# Patient Record
Sex: MALE | Race: ASIAN | NOT HISPANIC OR LATINO | URBAN - METROPOLITAN AREA
[De-identification: names, ages, dates, MRNs, and addresses within clinical notes are randomized per-mention and may not be internally consistent; named-entity substitution may affect disease eponyms.]

---

## 2023-11-08 ENCOUNTER — APPOINTMENT (OUTPATIENT)
Dept: PRIMARY CARE | Facility: CLINIC | Age: 23
End: 2023-11-08
Payer: COMMERCIAL

## 2023-11-08 ENCOUNTER — LAB (OUTPATIENT)
Dept: LAB | Facility: LAB | Age: 23
End: 2023-11-08
Payer: COMMERCIAL

## 2023-11-08 ENCOUNTER — OFFICE VISIT (OUTPATIENT)
Dept: PRIMARY CARE | Facility: CLINIC | Age: 23
End: 2023-11-08
Payer: COMMERCIAL

## 2023-11-08 VITALS
WEIGHT: 157.6 LBS | SYSTOLIC BLOOD PRESSURE: 122 MMHG | HEIGHT: 71 IN | DIASTOLIC BLOOD PRESSURE: 78 MMHG | TEMPERATURE: 98.2 F | BODY MASS INDEX: 22.06 KG/M2

## 2023-11-08 DIAGNOSIS — R10.13 EPIGASTRIC PAIN: Primary | ICD-10-CM

## 2023-11-08 DIAGNOSIS — R10.13 EPIGASTRIC PAIN: ICD-10-CM

## 2023-11-08 LAB
ALBUMIN SERPL BCP-MCNC: 4.6 G/DL (ref 3.4–5)
ALP SERPL-CCNC: 58 U/L (ref 33–120)
ALT SERPL W P-5'-P-CCNC: 30 U/L (ref 10–52)
ANION GAP SERPL CALC-SCNC: 10 MMOL/L (ref 10–20)
AST SERPL W P-5'-P-CCNC: 21 U/L (ref 9–39)
BILIRUB SERPL-MCNC: 0.5 MG/DL (ref 0–1.2)
BUN SERPL-MCNC: 12 MG/DL (ref 6–23)
CALCIUM SERPL-MCNC: 9.3 MG/DL (ref 8.6–10.3)
CHLORIDE SERPL-SCNC: 104 MMOL/L (ref 98–107)
CO2 SERPL-SCNC: 30 MMOL/L (ref 21–32)
CREAT SERPL-MCNC: 0.83 MG/DL (ref 0.5–1.3)
ERYTHROCYTE [DISTWIDTH] IN BLOOD BY AUTOMATED COUNT: 12 % (ref 11.5–14.5)
GFR SERPL CREATININE-BSD FRML MDRD: >90 ML/MIN/1.73M*2
GLUCOSE SERPL-MCNC: 89 MG/DL (ref 74–99)
HCT VFR BLD AUTO: 49.2 % (ref 41–52)
HGB BLD-MCNC: 16.5 G/DL (ref 13.5–17.5)
LIPASE SERPL-CCNC: 11 U/L (ref 9–82)
MCH RBC QN AUTO: 29.5 PG (ref 26–34)
MCHC RBC AUTO-ENTMCNC: 33.5 G/DL (ref 32–36)
MCV RBC AUTO: 88 FL (ref 80–100)
NRBC BLD-RTO: 0 /100 WBCS (ref 0–0)
PLATELET # BLD AUTO: 347 X10*3/UL (ref 150–450)
POTASSIUM SERPL-SCNC: 4.4 MMOL/L (ref 3.5–5.3)
PROT SERPL-MCNC: 7 G/DL (ref 6.4–8.2)
RBC # BLD AUTO: 5.59 X10*6/UL (ref 4.5–5.9)
SODIUM SERPL-SCNC: 140 MMOL/L (ref 136–145)
WBC # BLD AUTO: 6.7 X10*3/UL (ref 4.4–11.3)

## 2023-11-08 PROCEDURE — 83690 ASSAY OF LIPASE: CPT

## 2023-11-08 PROCEDURE — 80053 COMPREHEN METABOLIC PANEL: CPT

## 2023-11-08 PROCEDURE — 1036F TOBACCO NON-USER: CPT | Performed by: INTERNAL MEDICINE

## 2023-11-08 PROCEDURE — 36415 COLL VENOUS BLD VENIPUNCTURE: CPT

## 2023-11-08 PROCEDURE — 99213 OFFICE O/P EST LOW 20 MIN: CPT | Performed by: INTERNAL MEDICINE

## 2023-11-08 PROCEDURE — 85027 COMPLETE CBC AUTOMATED: CPT

## 2023-11-08 RX ORDER — OMEPRAZOLE 40 MG/1
40 CAPSULE, DELAYED RELEASE ORAL
COMMUNITY
Start: 2023-08-01 | End: 2024-01-11 | Stop reason: WASHOUT

## 2023-11-08 RX ORDER — METOPROLOL TARTRATE 25 MG/1
25 TABLET, FILM COATED ORAL 2 TIMES DAILY
COMMUNITY
Start: 2023-09-20

## 2023-11-08 ASSESSMENT — ENCOUNTER SYMPTOMS
OCCASIONAL FEELINGS OF UNSTEADINESS: 0
DEPRESSION: 0
LOSS OF SENSATION IN FEET: 0

## 2023-11-08 ASSESSMENT — PATIENT HEALTH QUESTIONNAIRE - PHQ9
2. FEELING DOWN, DEPRESSED OR HOPELESS: NOT AT ALL
SUM OF ALL RESPONSES TO PHQ9 QUESTIONS 1 AND 2: 0
1. LITTLE INTEREST OR PLEASURE IN DOING THINGS: NOT AT ALL

## 2023-11-08 NOTE — PATIENT INSTRUCTIONS
Increase your omeprazole to 40 mg twice a day and have blood work as we discussed.  Scheduled to see a gastroenterologist and come back to see me in 4 weeks.

## 2023-12-08 ENCOUNTER — OFFICE VISIT (OUTPATIENT)
Dept: PRIMARY CARE | Facility: CLINIC | Age: 23
End: 2023-12-08
Payer: COMMERCIAL

## 2023-12-08 VITALS
WEIGHT: 154.6 LBS | BODY MASS INDEX: 21.87 KG/M2 | DIASTOLIC BLOOD PRESSURE: 80 MMHG | TEMPERATURE: 98.4 F | SYSTOLIC BLOOD PRESSURE: 120 MMHG

## 2023-12-08 DIAGNOSIS — R10.13 EPIGASTRIC PAIN: Primary | ICD-10-CM

## 2023-12-08 PROCEDURE — 1036F TOBACCO NON-USER: CPT | Performed by: INTERNAL MEDICINE

## 2023-12-08 PROCEDURE — 99214 OFFICE O/P EST MOD 30 MIN: CPT | Performed by: INTERNAL MEDICINE

## 2023-12-08 RX ORDER — DEXLANSOPRAZOLE 60 MG/1
60 CAPSULE, DELAYED RELEASE ORAL DAILY
Qty: 30 CAPSULE | Refills: 11 | Status: SHIPPED | OUTPATIENT
Start: 2023-12-08 | End: 2024-12-07

## 2023-12-08 NOTE — PROGRESS NOTES
"Subjective   Patient ID: Diego Castillo is a 23 y.o. male who presents for Follow-up (1 month).    HPI     Note 11/8/23 reviewed.  Some improvement (~\"35%\") w/ less freq episodes  On increased PPI (BID). Notes increased issues if \"misses\" dose of PPI.  + exercise w/o diff  No diet pattern  No dysphagia  Gi appt pending  Never wakes from sleep  Able to exercise w/o difficulty    Left arm/shoulder pain- low grade.  Typically at rest.  Ok w/ activity.  No ROM issues.  Achy feeling.   2-3 x /week.  No pattern.  Better w/ shoulder mov't.  Radiates to arm.   Ongoing x a few mths.  Stable.     Cardiac MRI pending ([facility] cards).      Review of Systems    Objective   /80   Temp 36.9 °C (98.4 °F)   Wt 70.1 kg (154 lb 9.6 oz)   BMI 21.87 kg/m²     Physical Exam    Assessment/Plan     #1 CP- suspect largely GERD as improved w/ PPI.  Will cahngte to Dexilant.  Appt pending w/ Gi.  f/u  after GI eval and cardiac MRI.    #2 suspect low grade RC-reviewed.  Change sitting posture.  Home exercises reviewed.    Follow-up in 2 to 3 months     "

## 2023-12-14 ENCOUNTER — OFFICE VISIT (OUTPATIENT)
Dept: GASTROENTEROLOGY | Facility: CLINIC | Age: 23
End: 2023-12-14
Payer: COMMERCIAL

## 2023-12-14 ENCOUNTER — APPOINTMENT (OUTPATIENT)
Dept: GASTROENTEROLOGY | Facility: CLINIC | Age: 23
End: 2023-12-14
Payer: COMMERCIAL

## 2023-12-14 VITALS — OXYGEN SATURATION: 98 % | HEIGHT: 71 IN | HEART RATE: 81 BPM | WEIGHT: 154 LBS | BODY MASS INDEX: 21.56 KG/M2

## 2023-12-14 DIAGNOSIS — R10.13 EPIGASTRIC PAIN: ICD-10-CM

## 2023-12-14 PROCEDURE — 1036F TOBACCO NON-USER: CPT | Performed by: INTERNAL MEDICINE

## 2023-12-14 PROCEDURE — 99204 OFFICE O/P NEW MOD 45 MIN: CPT | Performed by: INTERNAL MEDICINE

## 2023-12-14 NOTE — PROGRESS NOTES
Subjective     History of Present Illness:   Diego Castillo is a 23 y.o. male with PMHx of epigastric/chest pain who presents to GI clinic for   Evaluation.   Since January has had epigastric/lower chest discomfort, intermittent, worse in AM, less later in day, worse after larger meal, good appetite, no weight loss, no N or V, occasional radiation to back - some relief with antacid - currently on omeprazole 40 mg BID - some improvement when dose increased to BID     No NSAID, nonsmoker, drinks EtOH on weekends, not much chocolate, no late meals or snacks    Regular exercise at gym - lifts weights -     No dysphagia, no odynophagia - occasional globus sensation    Had Cardiac MRI this morning   Past Medical History  As per HPI.     Social History  he  reports that he has never smoked. He has never been exposed to tobacco smoke. He has never used smokeless tobacco. He reports current alcohol use of about 2.0 standard drinks of alcohol per week. He reports that he does not use drugs.     Family History  his family history includes Asthma in his paternal grandfather; Hypertension in his mother.     Review of Systems  Review of Systems    Allergies  No Known Allergies    Medications  Current Outpatient Medications   Medication Instructions    dexlansoprazole (DEXILANT) 60 mg, oral, Daily, Do not crush or chew.    metoprolol tartrate (LOPRESSOR) 25 mg, oral, 2 times daily    omeprazole (PRILOSEC) 40 mg, oral, Daily RT        Objective   Visit Vitals  Pulse 81      Physical Exam    110/72    Lungs clear  CV rrr, no mrg  Abd - flat soft nontender       Lab Results   Component Value Date    WBC 6.7 11/08/2023    WBC 10.3 08/06/2019    HGB 16.5 11/08/2023    HGB 17.4 08/06/2019    HCT 49.2 11/08/2023    HCT 49.6 08/06/2019     11/08/2023     08/06/2019     Lab Results   Component Value Date     11/08/2023     08/06/2019    K 4.4 11/08/2023    K 3.5 08/06/2019     11/08/2023      08/06/2019    CO2 30 11/08/2023    CO2 26 08/06/2019    BUN 12 11/08/2023    BUN 16 08/06/2019    CREATININE 0.83 11/08/2023    CREATININE 1.08 08/06/2019    CALCIUM 9.3 11/08/2023    CALCIUM 10.4 (H) 08/06/2019    PROT 7.0 11/08/2023    BILITOT 0.5 11/08/2023    ALKPHOS 58 11/08/2023    ALT 30 11/08/2023    AST 21 11/08/2023    GLUCOSE 89 11/08/2023    GLUCOSE 120 (H) 08/06/2019     Electrocardiogram 12 Lead  Normal sinus rhythm  Rightward axis  Incomplete right bundle branch block  Borderline ECG  No previous ECGs available  Confirmed by EDER WAYNE MD ([id number]) on 8/13/2019 3:46:46 AM         Diego Castillo is a 23 y.o. male who presents for follow up of epigastric/lower chest discomfort - persists despite omeprazole, now 40 mg bid - will check result of cardiac MRI, will switch to dexlansoprazole 60 mg daily - discussed possible need for EGD         Itz Guan MD

## 2023-12-22 NOTE — PROGRESS NOTES
#1 CP- suspect largely GERD    Appt pending w/ Gi.  f/u  after GI eval and cardiac MRI.    #2 suspect low grade RC-reviewed.  Change sitting posture.  Home exercises reviewed.     Follow-up in 1 mth

## 2024-01-02 ENCOUNTER — TELEPHONE (OUTPATIENT)
Dept: PRIMARY CARE | Facility: CLINIC | Age: 24
End: 2024-01-02
Payer: COMMERCIAL

## 2024-01-02 NOTE — TELEPHONE ENCOUNTER
Jonathan has come back with a denial on pt's Dexlansoprazole 60 mg.  Pt must have tried and failed with 3 of the following mediations    Esomeprazole  Lansoprazole  Omeprazole  Pantoprazole     Please review and advise    01/11/24 Pt followed up withDr Ortiz with office visit

## 2024-01-11 ENCOUNTER — OFFICE VISIT (OUTPATIENT)
Dept: PRIMARY CARE | Facility: CLINIC | Age: 24
End: 2024-01-11
Payer: COMMERCIAL

## 2024-01-11 VITALS
TEMPERATURE: 98.4 F | DIASTOLIC BLOOD PRESSURE: 82 MMHG | BODY MASS INDEX: 22.49 KG/M2 | SYSTOLIC BLOOD PRESSURE: 120 MMHG | WEIGHT: 159 LBS

## 2024-01-11 DIAGNOSIS — K21.9 GASTROESOPHAGEAL REFLUX DISEASE WITHOUT ESOPHAGITIS: ICD-10-CM

## 2024-01-11 DIAGNOSIS — J45.990 EXERCISE-INDUCED ASTHMA (HHS-HCC): Primary | ICD-10-CM

## 2024-01-11 PROCEDURE — 1036F TOBACCO NON-USER: CPT | Performed by: INTERNAL MEDICINE

## 2024-01-11 PROCEDURE — 99213 OFFICE O/P EST LOW 20 MIN: CPT | Performed by: INTERNAL MEDICINE

## 2024-01-11 NOTE — PROGRESS NOTES
"Subjective   Patient ID: Diego Castillo is a 23 y.o. male who presents for Follow-up (From GI appt).    HPI     Note 12/8/23 reviewed.  On Dexilant x 3 weeks  Some improvement (~\"90%\") w/ less freq episodes  On increased PPI (BID). Notes increased issues if \"misses\" dose of PPI.  + exercise w/o diff    Never wakes from sleep  Able to exercise w/o difficulty    Syncope/palps- better w/ increased toprol.  -eval w/ cards at [facility]    Review of Systems    Objective   /82   Temp 36.9 °C (98.4 °F)   Wt 72.1 kg (159 lb)   BMI 22.49 kg/m²     Physical Exam    Assessment/Plan     #1 CP- suspect largely GERD as improved w/ PPI.  Essentially resolved. f/u  GI.    #2 suspect low grade RC-reviewed. Improved.  f/u  inb 2-4 weeks  #3 syncope/PVCs-     Follow-up in 2 to 3 months     "

## 2024-02-28 ENCOUNTER — OFFICE VISIT (OUTPATIENT)
Dept: PRIMARY CARE | Facility: CLINIC | Age: 24
End: 2024-02-28
Payer: COMMERCIAL

## 2024-02-28 VITALS
DIASTOLIC BLOOD PRESSURE: 76 MMHG | TEMPERATURE: 99.1 F | SYSTOLIC BLOOD PRESSURE: 128 MMHG | BODY MASS INDEX: 22.89 KG/M2 | WEIGHT: 161.8 LBS

## 2024-02-28 DIAGNOSIS — R05.2 SUBACUTE COUGH: Primary | ICD-10-CM

## 2024-02-28 DIAGNOSIS — J45.990 EXERCISE-INDUCED ASTHMA (HHS-HCC): ICD-10-CM

## 2024-02-28 PROCEDURE — 1036F TOBACCO NON-USER: CPT | Performed by: INTERNAL MEDICINE

## 2024-02-28 PROCEDURE — 99213 OFFICE O/P EST LOW 20 MIN: CPT | Performed by: INTERNAL MEDICINE

## 2024-02-28 RX ORDER — GUAIFENESIN 600 MG/1
1200 TABLET, EXTENDED RELEASE ORAL 2 TIMES DAILY
Qty: 120 TABLET | Refills: 11 | Status: SHIPPED | OUTPATIENT
Start: 2024-02-28 | End: 2025-02-27

## 2024-02-28 RX ORDER — BENZONATATE 100 MG/1
100 CAPSULE ORAL 3 TIMES DAILY PRN
Qty: 42 CAPSULE | Refills: 0 | Status: SHIPPED | OUTPATIENT
Start: 2024-02-28 | End: 2024-03-29

## 2024-02-28 RX ORDER — FLUTICASONE PROPIONATE AND SALMETEROL 250; 50 UG/1; UG/1
1 POWDER RESPIRATORY (INHALATION)
Qty: 60 EACH | Refills: 0 | Status: SHIPPED | OUTPATIENT
Start: 2024-02-28 | End: 2024-03-25

## 2024-02-28 ASSESSMENT — PATIENT HEALTH QUESTIONNAIRE - PHQ9
1. LITTLE INTEREST OR PLEASURE IN DOING THINGS: NOT AT ALL
SUM OF ALL RESPONSES TO PHQ9 QUESTIONS 1 AND 2: 0
2. FEELING DOWN, DEPRESSED OR HOPELESS: NOT AT ALL

## 2024-02-28 NOTE — PROGRESS NOTES
Subjective   Patient ID: Diego Castillo is a 23 y.o. male who presents for cough / chest congestion x 1 month.    HPI   Cough over the past approximately 4 weeks.  Gradually improving.  Some mucus production.  No fevers chills.  No night sweats.  No shortness of breath.  Able to exercise without difficulty.  Does not really feel ill.  Perhaps family has similar low-grade cough.  Does have history of exercise-induced asthma, not typically an issue this time of the year.  Some improvement with albuterol.  Review of Systems    Objective   /76 (BP Location: Left arm, Patient Position: Sitting, BP Cuff Size: Adult)   Temp 37.3 °C (99.1 °F)   Wt 73.4 kg (161 lb 12.8 oz)   BMI 22.89 kg/m²     Physical Exam  Alert and oriented x 3.  No acute distress.  Lungs clear to auscultation bilaterally without rales wheezing or rhonchi.  Heart regular.  Assessment/Plan     Cough-suspect postinfectious versus asthma.  No sign of active infection or other more serious physiology.  Will try short course of Advair by 1 month.  Continue albuterol as needed.  Benzo need as needed.  Asked patient to go directly to ED any increasing symptoms.  Follow-up 2 to 3 weeks if not resolved

## 2024-03-25 DIAGNOSIS — J45.990 EXERCISE-INDUCED ASTHMA (HHS-HCC): ICD-10-CM

## 2024-03-25 DIAGNOSIS — R05.2 SUBACUTE COUGH: ICD-10-CM

## 2024-03-25 RX ORDER — FLUTICASONE PROPIONATE AND SALMETEROL 250; 50 UG/1; UG/1
POWDER RESPIRATORY (INHALATION)
Qty: 60 EACH | Refills: 0 | Status: SHIPPED | OUTPATIENT
Start: 2024-03-25

## 2024-06-07 ENCOUNTER — APPOINTMENT (OUTPATIENT)
Dept: PRIMARY CARE | Facility: CLINIC | Age: 24
End: 2024-06-07
Payer: COMMERCIAL

## 2025-02-20 ENCOUNTER — APPOINTMENT (OUTPATIENT)
Dept: PRIMARY CARE | Facility: CLINIC | Age: 25
End: 2025-02-20
Payer: COMMERCIAL

## 2025-03-10 ENCOUNTER — APPOINTMENT (OUTPATIENT)
Dept: PRIMARY CARE | Facility: CLINIC | Age: 25
End: 2025-03-10
Payer: COMMERCIAL

## 2025-10-03 ENCOUNTER — APPOINTMENT (OUTPATIENT)
Dept: PRIMARY CARE | Facility: CLINIC | Age: 25
End: 2025-10-03
Payer: COMMERCIAL